# Patient Record
Sex: MALE | Race: WHITE | URBAN - METROPOLITAN AREA
[De-identification: names, ages, dates, MRNs, and addresses within clinical notes are randomized per-mention and may not be internally consistent; named-entity substitution may affect disease eponyms.]

---

## 2018-07-18 ENCOUNTER — THERAPY VISIT (OUTPATIENT)
Dept: PHYSICAL THERAPY | Facility: CLINIC | Age: 48
End: 2018-07-18
Payer: COMMERCIAL

## 2018-07-18 DIAGNOSIS — G89.29 CHRONIC RIGHT SHOULDER PAIN: Primary | ICD-10-CM

## 2018-07-18 DIAGNOSIS — M25.511 CHRONIC RIGHT SHOULDER PAIN: Primary | ICD-10-CM

## 2018-07-18 PROCEDURE — 97110 THERAPEUTIC EXERCISES: CPT | Mod: GP | Performed by: PHYSICAL THERAPIST

## 2018-07-18 PROCEDURE — 97161 PT EVAL LOW COMPLEX 20 MIN: CPT | Mod: GP | Performed by: PHYSICAL THERAPIST

## 2018-07-18 PROCEDURE — 97112 NEUROMUSCULAR REEDUCATION: CPT | Mod: GP | Performed by: PHYSICAL THERAPIST

## 2018-07-18 NOTE — LETTER
[facility]  [address] [place] [id number]  [phone]    2018    Re: Flaquito Painter   :   1970  MRN:  [id number]   REFERRING PHYSICIAN:   Rolf Severin Hauck    [facility]    Date of Initial Evaluation:  2018  Visits:  Rxs Used: 1  Reason for Referral:  Chronic right shoulder pain    EVALUATION SUMMARY    [facility] Initial Evaluation  Subjective:  Patient is a 48 year old male presenting with rehab right shoulder hpi. The history is provided by the patient. No  was used.   Flaquito Painter is a 48 year old male with a right shoulder condition.  Condition occurred with:  Unknown cause.  Condition occurred: for unknown reasons.  This is a chronic condition  Pt started noticing posterior right shoulder pain in 2018 for unknown reasons.  MRI on 18 tendinosis of supraspinatus, nondisplaced posterior superior labral tear, and subacrominal subdeltoid bursitis. He did receive in injection from Dr Amor ~2 weeks ago with mild help.  Pt is right handed. Patient reports pain:  Posterior.  Radiates to:  Upper arm.  Pain is described as sharp and is intermittent and reported as 8/10.  Associated with: clicking. Pain is worse during the day.  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  Since onset symptoms are unchanged.  Special tests:  MRI.  Previous treatment includes other and chiropractic (injection).  There was mild improvement following previous treatment.   Pertinent medical history includes:  None.  Medical allergies: no.  Other surgeries include:  Other.  Current medications:  Other.  Current occupation is sales.                    Barriers include:  None as reported by the patient.  Red flags:  None as reported by the patient.                     Objective:  Standing Alignment:    Shoulder/UE:  Rounded shoulders, protracted scapula L, protracted scapula R and scapular downward rotation R  Flexibility/Screens:   Upper Extremity:    Decreased left upper extremity flexibility at:  Pectoralis Minor  Decreased right upper extremity flexibility present at:  Pectoralis Minor  Shoulder Evaluation:  ROM:  AROM:  : IR/ER in 90 abd: L 52-0-56, R 40-0-64.  Flexion:  Left:  143    Right:  144    Abduction:  Left: 152   Right:  129 pain  External Rotation:  Left:  55    Right:  55  Flexion/External Rotation:  Left:  T4    Right:  T2  Extension/Internal Rotation:  Left:  T3    Right:  T7 pain    PROM:    Internal Rotation:  Left:  84    Right:  42  Strength:  : 4/5 ER strength on R in 90 abd.  Flexion: Right: 5/5     Pain:   Extension:  Right: 5/5    Pain:  Abduction:  Right: 5/5     Pain:  Adduction:  Right: 5/5     Pain:  Internal Rotation:  Right: 5/5     Pain:  External Rotation:   Right:5/5     Pain:    Special Tests:  Special tests assessed shoulder: Neg [place]'s R.  Right shoulder positive for the following special tests:Impingement (+ crossover; neg Neer and H-K)  Palpation:    Right shoulder tenderness present at: Supraspinatus  Mobility Tests:    Glenohumeral posterior right:  Hypomobile    Assessment/Plan:    Patient is a 48 year old male with right side shoulder complaints.    Patient has the following significant findings with corresponding treatment plan.                Diagnosis 1:  Shoulder impingement, supraspinatus tendinosis, and post/sup labral tear  Pain -  hot/cold therapy and home program  Decreased ROM/flexibility - manual therapy and therapeutic exercise  Decreased joint mobility - manual therapy and therapeutic exercise  Decreased strength - therapeutic exercise and therapeutic activities  Decreased proprioception - neuro re-education and therapeutic activities  Inflammation - cold therapy and self management/home program  Impaired muscle  performance - neuro re-education  Decreased function - therapeutic activities  Impaired posture - neuro re-education    Therapy Evaluation Codes:   1) History comprised of:   Personal factors that impact the plan of care:      None.    Comorbidity factors that impact the plan of care are:      None.     Medications impacting care: None.  2) Examination of Body Systems comprised of:   Body structures and functions that impact the plan of care:      Shoulder.   Activity limitations that impact the plan of care are:      Sports, Throwing and reaching.  3) Clinical presentation characteristics are:   Stable/Uncomplicated.  4) Decision-Making    Low complexity using standardized patient assessment instrument and/or measureable assessment of functional outcome.  Cumulative Therapy Evaluation is: Low complexity.    Previous and current functional limitations:  (See Goal Flow Sheet for this information)    Short term and Long term goals: (See Goal Flow Sheet for this information)     Communication ability:  Patient appears to be able to clearly communicate and understand verbal and written communication and follow directions correctly.  Treatment Explanation - The following has been discussed with the patient:   RX ordered/plan of care  Anticipated outcomes  Possible risks and side effects  This patient would benefit from PT intervention to resume normal activities.   Rehab potential is good.    Frequency:  1 X week, once daily  Duration:  for 12 weeks  Discharge Plan:  Achieve all LTG.  Independent in home treatment program.  Reach maximal therapeutic benefit.    Thank you for your referral.    INQUIRIES  Therapist:Brandy Casiano, PT, ATC  [address] [address]  Phone: [phone]  Fax: [phone]

## 2018-07-18 NOTE — PROGRESS NOTES
[facility] Initial Evaluation  Subjective:  Patient is a 48 year old male presenting with rehab right shoulder hpi. The history is provided by the patient. No  was used.   Flaquito Painter is a 48 year old male with a right shoulder condition.  Condition occurred with:  Unknown cause.  Condition occurred: for unknown reasons.  This is a chronic condition  Pt started noticing posterior right shoulder pain in March of 2018 for unknown reasons.  MRI on 6/21/18 tendinosis of supraspinatus, nondisplaced posterior superior labral tear, and subacrominal subdeltoid bursitis. He did receive in injection from Dr Amor ~2 weeks ago with mild help.  Pt is right handed. .    Patient reports pain:  Posterior.  Radiates to:  Upper arm.  Pain is described as sharp and is intermittent and reported as 8/10.  Associated with: clicking. Pain is worse during the day.  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  Since onset symptoms are unchanged.  Special tests:  MRI.  Previous treatment includes other and chiropractic (injection).  There was mild improvement following previous treatment.                    Barriers include:  None as reported by the patient.    Red flags:  None as reported by the patient.                        Objective:  Standing Alignment:      Shoulder/UE:  Rounded shoulders, protracted scapula L, protracted scapula R and scapular downward rotation R                  Flexibility/Screens:     Upper Extremity:    Decreased left upper extremity flexibility at:  Pectoralis Minor    Decreased right upper extremity flexibility present at:  Pectoralis Minor                           Shoulder Evaluation:  ROM:  AROM:  : IR/ER in 90 abd: L 52-0-56, R 40-0-64.  Flexion:  Left:  143    Right:  144    Abduction:  Left: 152   Right:  129 pain      External  Rotation:  Left:  55    Right:  55          Flexion/External Rotation:  Left:  T4    Right:  T2  Extension/Internal Rotation:  Left:  T3    Right:  T7 pain    PROM:            Internal Rotation:  Left:  84    Right:  42                      Strength:  : 4/5 ER strength on R in 90 abd.  Flexion: Right: 5/5     Pain:   Extension:  Right: 5/5    Pain:  Abduction:  Right: 5/5     Pain:  Adduction:  Right: 5/5     Pain:  Internal Rotation:  Right: 5/5     Pain:  External Rotation:   Right:5/5     Pain:              Special Tests:  Special tests assessed shoulder: Neg [place]'s R.    Right shoulder positive for the following special tests:Impingement (+ crossover; neg Neer and H-K)  Palpation:      Right shoulder tenderness present at: Supraspinatus  Mobility Tests:      Glenohumeral posterior right:  Hypomobile                                             General     ROS    Assessment/Plan:    Patient is a 48 year old male with right side shoulder complaints.    Patient has the following significant findings with corresponding treatment plan.                Diagnosis 1:  Shoulder impingement, supraspinatus tendinosis, and post/sup labral tear  Pain -  hot/cold therapy and home program  Decreased ROM/flexibility - manual therapy and therapeutic exercise  Decreased joint mobility - manual therapy and therapeutic exercise  Decreased strength - therapeutic exercise and therapeutic activities  Decreased proprioception - neuro re-education and therapeutic activities  Inflammation - cold therapy and self management/home program  Impaired muscle performance - neuro re-education  Decreased function - therapeutic activities  Impaired posture - neuro re-education    Therapy Evaluation Codes:   1) History comprised of:   Personal factors that impact the plan of care:      None.    Comorbidity factors that impact the plan of care are:      None.     Medications impacting care: None.  2) Examination of Body Systems comprised of:   Body  structures and functions that impact the plan of care:      Shoulder.   Activity limitations that impact the plan of care are:      Sports, Throwing and reaching.  3) Clinical presentation characteristics are:   Stable/Uncomplicated.  4) Decision-Making    Low complexity using standardized patient assessment instrument and/or measureable assessment of functional outcome.  Cumulative Therapy Evaluation is: Low complexity.    Previous and current functional limitations:  (See Goal Flow Sheet for this information)    Short term and Long term goals: (See Goal Flow Sheet for this information)     Communication ability:  Patient appears to be able to clearly communicate and understand verbal and written communication and follow directions correctly.  Treatment Explanation - The following has been discussed with the patient:   RX ordered/plan of care  Anticipated outcomes  Possible risks and side effects  This patient would benefit from PT intervention to resume normal activities.   Rehab potential is good.    Frequency:  1 X week, once daily  Duration:  for 12 weeks  Discharge Plan:  Achieve all LTG.  Independent in home treatment program.  Reach maximal therapeutic benefit.    Please refer to the daily flowsheet for treatment today, total treatment time and time spent performing 1:1 timed codes.

## 2018-07-26 ENCOUNTER — THERAPY VISIT (OUTPATIENT)
Dept: PHYSICAL THERAPY | Facility: CLINIC | Age: 48
End: 2018-07-26
Payer: COMMERCIAL

## 2018-07-26 DIAGNOSIS — G89.29 CHRONIC RIGHT SHOULDER PAIN: ICD-10-CM

## 2018-07-26 DIAGNOSIS — M25.511 CHRONIC RIGHT SHOULDER PAIN: ICD-10-CM

## 2018-07-26 PROCEDURE — 97112 NEUROMUSCULAR REEDUCATION: CPT | Mod: GP | Performed by: PHYSICAL THERAPIST

## 2018-07-26 PROCEDURE — 97110 THERAPEUTIC EXERCISES: CPT | Mod: GP | Performed by: PHYSICAL THERAPIST

## 2018-08-02 ENCOUNTER — THERAPY VISIT (OUTPATIENT)
Dept: PHYSICAL THERAPY | Facility: CLINIC | Age: 48
End: 2018-08-02
Payer: COMMERCIAL

## 2018-08-02 DIAGNOSIS — G89.29 CHRONIC RIGHT SHOULDER PAIN: ICD-10-CM

## 2018-08-02 DIAGNOSIS — M25.511 CHRONIC RIGHT SHOULDER PAIN: ICD-10-CM

## 2018-08-02 PROCEDURE — 97112 NEUROMUSCULAR REEDUCATION: CPT | Mod: GP | Performed by: PHYSICAL THERAPIST

## 2018-08-02 PROCEDURE — 97140 MANUAL THERAPY 1/> REGIONS: CPT | Mod: GP | Performed by: PHYSICAL THERAPIST

## 2018-08-02 PROCEDURE — 97110 THERAPEUTIC EXERCISES: CPT | Mod: GP | Performed by: PHYSICAL THERAPIST

## 2018-08-08 ENCOUNTER — THERAPY VISIT (OUTPATIENT)
Dept: PHYSICAL THERAPY | Facility: CLINIC | Age: 48
End: 2018-08-08
Payer: COMMERCIAL

## 2018-08-08 DIAGNOSIS — M25.511 CHRONIC RIGHT SHOULDER PAIN: ICD-10-CM

## 2018-08-08 DIAGNOSIS — G89.29 CHRONIC RIGHT SHOULDER PAIN: ICD-10-CM

## 2018-08-08 PROCEDURE — 97112 NEUROMUSCULAR REEDUCATION: CPT | Mod: GP | Performed by: PHYSICAL THERAPIST

## 2018-08-08 PROCEDURE — 97140 MANUAL THERAPY 1/> REGIONS: CPT | Mod: GP | Performed by: PHYSICAL THERAPIST

## 2018-08-08 PROCEDURE — 97110 THERAPEUTIC EXERCISES: CPT | Mod: GP | Performed by: PHYSICAL THERAPIST

## 2018-09-05 ENCOUNTER — THERAPY VISIT (OUTPATIENT)
Dept: PHYSICAL THERAPY | Facility: CLINIC | Age: 48
End: 2018-09-05
Payer: COMMERCIAL

## 2018-09-05 DIAGNOSIS — G89.29 CHRONIC RIGHT SHOULDER PAIN: ICD-10-CM

## 2018-09-05 DIAGNOSIS — M25.511 CHRONIC RIGHT SHOULDER PAIN: ICD-10-CM

## 2018-09-05 PROCEDURE — 97112 NEUROMUSCULAR REEDUCATION: CPT | Mod: GP | Performed by: PHYSICAL THERAPIST

## 2018-09-05 PROCEDURE — 97140 MANUAL THERAPY 1/> REGIONS: CPT | Mod: GP | Performed by: PHYSICAL THERAPIST

## 2018-09-05 PROCEDURE — 97110 THERAPEUTIC EXERCISES: CPT | Mod: GP | Performed by: PHYSICAL THERAPIST

## 2018-09-26 ENCOUNTER — THERAPY VISIT (OUTPATIENT)
Dept: PHYSICAL THERAPY | Facility: CLINIC | Age: 48
End: 2018-09-26
Payer: COMMERCIAL

## 2018-09-26 DIAGNOSIS — G89.29 CHRONIC RIGHT SHOULDER PAIN: ICD-10-CM

## 2018-09-26 DIAGNOSIS — M25.511 CHRONIC RIGHT SHOULDER PAIN: ICD-10-CM

## 2018-09-26 PROCEDURE — 97112 NEUROMUSCULAR REEDUCATION: CPT | Mod: GP | Performed by: PHYSICAL THERAPIST

## 2018-09-26 PROCEDURE — 97140 MANUAL THERAPY 1/> REGIONS: CPT | Mod: GP | Performed by: PHYSICAL THERAPIST

## 2018-09-26 PROCEDURE — 97110 THERAPEUTIC EXERCISES: CPT | Mod: GP | Performed by: PHYSICAL THERAPIST

## 2018-09-26 NOTE — LETTER
[facility]  [address] [place] [id number]  [phone]    2018    Re: Flaquito Painter   :   1970  MRN:  [id number]   REFERRING PHYSICIAN:   Rolf Severin Hauck    [facility]    Date of Initial Evaluation:  2018  Visits:  Rxs Used: 6  Reason for Referral:  Chronic right shoulder pain    PROGRESS  REPORT  Progress reporting period is from 18 to 18.       SUBJECTIVE  Subjective changes noted by patient:  Moderate improvement reported in R shoulder overall.  Sharp pain episodes are less frequent, intense, and resolve quickly now.  He has not been throwing lately but overhead basketball shots are still somewhat painful.  Current pain level is 0-9/10  .    Initial Pain level: 8/10. Changes in function:  Yes (See Goal flowsheet attached for changes in current functional level).  Adverse reaction to treatment or activity: None    OBJECTIVE  -Right shoulder AROM: flex 151 ERP, abd 138 ERP, ER 73, IR/ext T7 (no pain now), ER/abd T3  -MMT: all 5/5 and pain free (including ER in 90 abd)  -Neg impingement  -Mild TTP remains to R supraspinatus  -Mild posterior capsule tightness on R  -HEP is focusing on posture, pec and sleeper stretch (stressed gentle for sleeper), ball scap stab, RC isotonics, and proprio drills      ASSESSMENT/PLAN  Updated problem list and treatment plan: Diagnosis 1:  tendinosis of supraspinatus, nondisplaced posterior superior labral tear, and subacrominal subdeltoid bursitis  Pain -  hot/cold therapy and home program  Decreased ROM/flexibility - manual therapy and therapeutic exercise  Decreased joint mobility - manual therapy and therapeutic exercise  Decreased strength - therapeutic exercise and therapeutic activities  Decreased proprioception - neuro re-education and therapeutic activities  Inflammation - cold therapy and self management/home program  Impaired muscle performance - neuro re-education  Decreased  function - therapeutic activities  Impaired posture - neuro re-education  STG/LTGs have been met or progress has been made towards goals:  Yes (See Goal flow sheet completed today.)  Assessment of Progress: The patient's progress has plateaued.  Self Management Plans:  Patient is independent in a home treatment program.  Patient is independent in self management of symptoms.  I have re-evaluated this patient and find that the nature, scope, duration and intensity of the therapy is appropriate for the medical condition of the patient.  Flaquito continues to require the following intervention to meet STG and LTG's:  PT intervention is no longer required to meet STG/LTG.    Recommendations:  Pt is ready to be independent with with HEP.  If problems arise, he will return.    Thank you for your referral.    INQUIRIES  Therapist: Brandy Casiano, PT, ATC   [address] [address]  Phone: [phone]  Fax: [phone]

## 2018-09-26 NOTE — PROGRESS NOTES
Subjective:  HPI                    Objective:  System    Physical Exam    General     ROS    Assessment/Plan:    PROGRESS  REPORT    Progress reporting period is from 7/18/18 to 9/26/18.       SUBJECTIVE  Subjective changes noted by patient:  Moderate improvement reported in R shoulder overall.  Sharp pain episodes are less frequent, intense, and resolve quickly now.  He has not been throwing lately but overhead basketball shots are still somewhat painful.  Current pain level is 0-9/10  .    Initial Pain level: 8/10.   Changes in function:  Yes (See Goal flowsheet attached for changes in current functional level)  Adverse reaction to treatment or activity: None    OBJECTIVE  -Right shoulder AROM: flex 151 ERP, abd 138 ERP, ER 73, IR/ext T7 (no pain now), ER/abd T3  -MMT: all 5/5 and pain free (including ER in 90 abd)  -Neg impingement  -Mild TTP remains to R supraspinatus  -Mild posterior capsule tightness on R  -HEP is focusing on posture, pec and sleeper stretch (stressed gentle for sleeper), ball scap stab, RC isotonics, and proprio drills        ASSESSMENT/PLAN  Updated problem list and treatment plan: Diagnosis 1:  tendinosis of supraspinatus, nondisplaced posterior superior labral tear, and subacrominal subdeltoid bursitis  Pain -  hot/cold therapy and home program  Decreased ROM/flexibility - manual therapy and therapeutic exercise  Decreased joint mobility - manual therapy and therapeutic exercise  Decreased strength - therapeutic exercise and therapeutic activities  Decreased proprioception - neuro re-education and therapeutic activities  Inflammation - cold therapy and self management/home program  Impaired muscle performance - neuro re-education  Decreased function - therapeutic activities  Impaired posture - neuro re-education  STG/LTGs have been met or progress has been made towards goals:  Yes (See Goal flow sheet completed today.)  Assessment of Progress: The patient's progress has plateaued.  Self  Management Plans:  Patient is independent in a home treatment program.  Patient is independent in self management of symptoms.  I have re-evaluated this patient and find that the nature, scope, duration and intensity of the therapy is appropriate for the medical condition of the patient.  Flaquito continues to require the following intervention to meet STG and LTG's:  PT intervention is no longer required to meet STG/LTG.    Recommendations:  Pt is ready to be independent with with HEP.  If problems arise, he will return.      Please refer to the daily flowsheet for treatment today, total treatment time and time spent performing 1:1 timed codes.

## 2018-12-05 PROBLEM — G89.29 CHRONIC RIGHT SHOULDER PAIN: Status: RESOLVED | Noted: 2018-07-18 | Resolved: 2018-12-05

## 2018-12-05 PROBLEM — M25.511 CHRONIC RIGHT SHOULDER PAIN: Status: RESOLVED | Noted: 2018-07-18 | Resolved: 2018-12-05
